# Patient Record
(demographics unavailable — no encounter records)

---

## 2024-10-11 NOTE — ASSESSMENT
[FreeTextEntry1] : FELICIANO FISH is here for an appointment for a follow-up of weight loss medications. Patient doing well on zepbound. Patient had GI symptoms due to E.coli infection.Insomnia during the first day of injection. Appetite comes back on day 4.   Patient Current weight: 143<149.6<--157<--149<--144<--140<--136.8 BMI.26.16<--.27.0<--28.5<- :27.5<-- 26.4<--24.9  Nutrition: basically same, increased protein, not same appetite suppression in comparison of ozempic/Wegovy Breakfast - smoothie, collagen, cottage cheese. Lunch- salad, grilled chicken, hummus Dinner- protein, starch and vegetable Snacking - none Physical activity- walking, weights non consistently  Medication compliance- good  Plan of Care: Encouraged weights and strength training. Regular meals and portions. Increased to 10 mg at this time. To monitor sx for nausea.  Stressed weight bearing exercise.

## 2024-10-11 NOTE — REASON FOR VISIT
[Home] : at home, [unfilled] , at the time of the visit. [Medical Office: ([facility])___] : at the medical office located in  [Patient] : the patient [Follow-Up] : a follow-up visit

## 2024-10-22 NOTE — PHYSICAL EXAM
[FreeTextEntry3] : All fingernails with distal onycholysis, traumatic superficial changes;  R index nail with obvious cystic depression, subungual debris  also:  + dark brown papule R infraorbital

## 2024-10-22 NOTE — HISTORY OF PRESENT ILLNESS
Wound Care Progress Note    Chief Complaint:   Chief Complaint   Patient presents with    Wound     L posterior heel       History of Present Illness:     6/3/2024    Christian Stanford is a 81 year old male with a past medical history of CVA in 2020, HTN, HLD, hypothyroidism, GERD, anemia, end stage PAD, s/p TMA in 2023, s/p left foot multiple angioplasties. He is s/p 6 weeks of IV antibiotics secondary to left lateral foot wound, fifth metatarsal osteomyelitis by MRI on 3/12/2024 (ID: Dr. Browning). He is s/p LLE cellulitis and course of Keflex in May.   He presents to the wound center today for evaluation and treatment of left foot surgical wound and pressure injury wound (left heel; present at least since 12/19/2023 as per EMR review). He is accompanied by his spouse.   His vascular specialist is Dr. Benitez from [facility] in [place]; last encounter was on 5/15/2024- next f/u in 6 months; recommendations- \"Continue to increase activity  Follow-up in 6 months with noninvasive studies and office visit\".  Patient has been under care of different wound care provider here; this is his first encounter with this provider.    6/10/2024    Follow up visit. No new c/o.    Additional EMR review:  Vascular arterial duplex on 2/9/2024 demonstrated the following:   \"  LEFT: Common Femoral Artery: <50% stenosis.  Proximal Superficial   Femoral Artery: <50% stenosis.  Proximal Popliteal: <50% stenosis.  Distal   PTA: occluded.  Distal GENESIS: patent. Distal peroneal artery patent. Absent   flow in left posterior tibial deep venous arterialization stent.     LEFT: Absent flow in left posterior tibial deep venous arterialization   Stent\"  2. DREAD study from 2/9/2024: Left DREAD: Left Doppler Waveforms: PTA: Intermediate resistive monophasic. DPA: Intermediate resistive monophasic. Right DREAD   Right Doppler Waveforms: PTA: Low resistive monophasic. DPA: Intermediate resistive monophasic. Right PPG Waveforms: 1st Toe: Mildly  [de-identified] : Pt. c/o chronic problems with nails;  affecting all fingernails and some toenails;  Took terbinafine x 3 months in 2023 for PAS ++ onycho of right 3rd finger;  however, recently in 7/2024 had surgery to remove cyst R index finger with resulting nail changes; using ciclopirox polish from podiatry, c/o persistent problems abnormal.   3. Vas arterial imaging from 3/11/2024:  Patent right lower extremity vasculature with significant calcifications with mild velocity elevation in the distal superficial femoral artery and popliteal artery.   Patent left lower extremity vasculature with significant calcificiations decreased velocities in the tibial arteries.     Wound site pain: 4/10. Pain type:  dull and throbbing.  Aggravating factors:  walking  Alleviating factors:medication including narcotics  .. He is under care of pain specialist (Dr. Goddard at [facility]); current pain/sleep medications-  Butrans patch 15 mcg, Lyrica 50 TID, Trazodone 100 mg at bedtime. Last apt with Dr. Goddard on 5/6/2024 and f/u in 3 months. He is referred for possible DRG stimulator. Pt reports overall improved pain relief with pain relieving patch (Butrans).    6/17/2024    Follow up. No new c/o.    6/24/2024    Follow up visit. No new c/o BLE skin/wound care.  He is s/p x-ray of left heel/foot on 6/17/2024 and venous reflux BLE on 6/19/2024 (see results interpretations as below).  Pertinent labs from Sed Rate 12, CRP less than 0.3.  Other: patient c/o lower back pain (sacral area) but denies pressure injury; spouse states that he has pressure relief seat cushion at home but is not using it; he is also using diapers due to incontinence     7/1/2024    Follow up. No new c/o.    7/8/2024    Weekly follow up. No new c/o. Spouse changed wound dressing at home on 7/4/2024.     8/5/2024    Follow up visit. Patient denies any new issues.     8/12/2024    The same as above.    8/19/2024    Follow up. No new c/o. Patient is asking if he should  his new DM shoes.     9/3/2024    Follow up. No new c/o.    9/9/2024    Follow up visit. No new c/o.    As per RN wound care note: Offloading to left heel noted to have shited upward with offloading over wound.  Patient also states he is now sleeping in bed again (no longer in recliner with legs down). Discussed he is not  currently using a pillow or heel offloading boots.     9/16/2024    No new c/o. Patient started Cresencio.     9/23/2024    Patient c/o not feeling well on Friday- slight nausea, diarrhea, possible increased swelling to both LE. He is better today. He stopped Cresencio in \"case Cresencio caused symptoms.       Elevation:  The patient is not consistently elevating the lower extremities during the day when sitting. The patient is sleeping in bed at night with legs up; he is ambulating in the home short time/distance    Appetite is good    Falls since last visit: la    Pressure relief:  the patient is using the following pressure relief measures since our last visit:  felt (left foot) Patient has his new shoes/brace- limited use due to heel ulcer    Past Medical History:   Diagnosis Date    Anemia     Cerebral infarction  (CMD)     Chronic pain     Essential (primary) hypertension     Gastroesophageal reflux disease     High cholesterol     Thyroid condition         Past Surgical History:   Procedure Laterality Date    Back surgery      Cardiac catherization      Carotid stent  2020    Femoral bypass  01/28/2023    Knee surgery Left 2000    Vascular surgery         Social History     Socioeconomic History    Marital status: /Civil Union     Spouse name: Not on file    Number of children: Not on file    Years of education: Not on file    Highest education level: Not on file   Occupational History    Not on file   Tobacco Use    Smoking status: Never    Smokeless tobacco: Never   Vaping Use    Vaping status: never used   Substance and Sexual Activity    Alcohol use: Never    Drug use: Never    Sexual activity: Not on file   Other Topics Concern    Not on file   Social History Narrative    Not on file     Social Determinants of Health     Financial Resource Strain: Medium Risk (3/15/2024)    [facility]    Overall Financial Resource Strain (CARDIA)      Difficulty of Paying Living Expenses: Somewhat hard   Food Insecurity: No Food Insecurity (3/15/2024)    [facility]    Hunger Vital Sign     Worried About Running Out of Food in the Last Year: Never true     Ran Out of Food in the Last Year: Never true   Transportation Needs: No Transportation Needs (3/15/2024)    [facility]    PRAPARE - Transportation     Lack of Transportation (Medical): No     Lack of Transportation (Non-Medical): No   Physical Activity: Not on file   Stress: Not on file   Social Connections: Not on file   Interpersonal Safety: Not on file        ALLERGIES:  Dairy digestive     Current Outpatient Medications   Medication Sig Dispense Refill    amLODIPine (NORVASC) 5 MG tablet [None received]      Xarelto 2.5 MG Tab [None received]      pregabalin (LYRICA) 50 MG capsule [None received]      lisinopril (ZESTRIL) 10 MG tablet [None received]      levothyroxine 50 MCG tablet [None received]      gabapentin (NEURONTIN) 300 MG capsule [None received]      esomeprazole (NexIUM) 40 MG capsule [None received]      Baclofen 5 MG tablet [None received]       No current facility-administered medications for this encounter.        Review of Systems  Review of Systems   Constitutional:  Negative for activity change, appetite change, chills, diaphoresis, fatigue, fever and unexpected weight change.   HENT:  Positive for hearing loss.    Respiratory: Negative.     Cardiovascular:  Positive for leg swelling. Negative for palpitations.   Gastrointestinal:  Negative for abdominal pain, blood in stool, constipation, nausea and vomiting.   Genitourinary: Negative.         Incontinent of urine   Musculoskeletal:  Positive for arthralgias, back pain and gait problem.   Skin:  Positive for rash and wound.   Neurological:  Negative for dizziness and headaches.    Psychiatric/Behavioral:  Negative for confusion.        Physical Exam  Vitals:    09/23/24 0820   BP: (!) 162/71   Pulse: 73   Temp: 97.8 °F (36.6 °C)      SpO2 Readings from Last 1 Encounters:   09/23/24 96%        Wt Readings from Last 4 Encounters:   09/23/24 76 kg (167 lb 8.8 oz)   09/16/24 77.7 kg (171 lb 4.8 oz)   09/09/24 78.6 kg (173 lb 4.5 oz)   09/03/24 78.7 kg (173 lb 8 oz)         Physical Exam  Vitals reviewed.   Constitutional:       General: He is not in acute distress.  HENT:      Head: Normocephalic.      Neck: Normal range of motion.   Pulmonary:      Effort: No respiratory distress.   Abdominal:      General: There is no distension.   Musculoskeletal:      Right lower leg: Edema present.      Left lower leg: Edema present.      Comments: Ambulates with a walker, poor posture   Feet:      Right foot:      Toenail Condition: Right toenails are abnormally thick.      Left foot:      Toenail Condition: Left toenails are abnormally thick.      Comments: Chronic BLE hyperpigmentation   Skin:     Findings: Lesion and rash present.   Neurological:      Mental Status: He is alert and oriented to person, place, and time. Mental status is at baseline.      Motor: No weakness.      Gait: Gait abnormal.   Psychiatric:         Mood and Affect: Mood normal.         Behavior: Behavior normal.         Wound Heel Left Posterior (Active)   Date First Assessed/Time First Assessed: 12/19/23 0800   Present on Original Admission: Yes  Location: Heel  Laterality: Left  Modifier: Posterior      Assessments 9/23/2024  8:30 AM   Wound Image      Dressing Assessment Intact;Drainage present   Dressing Activity Changed   Dressing Changed On   09/23/24   Wound Exudate Moderate;Serosanguineous;No odor   Cleansing Agent Commercial cleansing solution   Wound Bed/Tissue Type Red   Periwound Condition Dry;Callus   Wound Edge Unattached to wound bed;Well defined   Wound Status Unchanged   Topical Agent Collagen   Wound Dressing  Foam (Ioplex, 1 thin, 2 thick felt)   Wound Last Measured 09/23/24   Wound Length (cm) 0.3 cm   Wound Width (cm) 1.5 cm   Wound Depth (cm) 0.4 cm   Wound Surface Area (cm^2) 0.45 cm^2   Wound Volume (cm^3) 0.18 cm^3   PhotoTaken? Yes   Wound Volume Change (Initial) -4.77 cm3   Wound Volume % Change (Initial) -96.36 %   Wound Volume Change (30 days) 0.14 cm3   Wound Volume % Change (30 days) 400 %      Current Treatment Regimen:        Laboratory assessments:   No results found for: \"PAB\"    No results available in last 24 hours   Lab Results   Component Value Date    CRP <3.0 06/17/2024     No results found for this or any previous visit.  No results found for this or any previous visit.         Assessment and Plan:   Problem List Items Addressed This Visit          Cardiac and Vasculature    Venous insufficiency of both lower extremities     Edema Assessment and Plan:    - patient is able to only tolerate approximately 5-10 mmHg compression therapy and is not always adherent to use of Tubigrip  - s/p venous reflux on 6/19/2024: demonstrated deep and superficial venous insufficiency (R GSV); pt is to follow up with his doctor in October (apt is pending)  - reviewed options for in- home use of pneumatic compression- patient will discuss it with his vascular surgeon during next visit  - reviewed options for radiofrequency ablation- patient will discuss it with his vascular surgeon during next visit    The patient's bilateral LE edema is not well controlled. We will thus continue current compression as below in therapy plan orders.  There is persistent statis dermatitis (no change)     See therapy plan orders below    The patient was instructed to:  Elevate legs while sitting-above the heart if possible  Sleep in bed at night  Ambulate frequently during the day  Avoid salt shaker, reduce high sodium content foods  Monitor for compromised arterial status such as increased pain, toes turning blue or cool. If this occurs,  remove and loosen wraps and notify wound center.  Keep dressings clean and dry  Take diuretics if and as prescribed   Use Cpap daily when sleeping if prescribed           Relevant Orders    Complete Wound Care Posterior Heel    Complete Wound Care Friction/Shear Distal Foot       Musculoskeletal and Injuries    Arterial leg ulcer  (CMD)     Wound Assessment:     Wound classification: Arterial Ulcer     Wound site: Left Posterior Foot- remains healed; there is adherent dry scab - mechanically removed     Plan:     -Continue with surgical shoe     -Continue with high protein diet     -Offloading felt         See therapy plan orders below for additional local wound orders     The patient was instructed to call with signs or symptoms of infection including redness, warmth, odor, fever, chills, increasing wound site pain.  The patient was instructed about wound care procedure/technique.    Updated with home care nurse about care plan:  Yes            Relevant Orders    Complete Wound Care Posterior Heel    Complete Wound Care Friction/Shear Distal Foot    Pressure injury of left heel, unstageable  (CMD) - Primary     Location: Left heel:    Etiology: pressure injury; wound healing is stalled    X-ray on 6/17/2024: no evidence of osteomyelitis, CRP/ESR- normal  Insurance denied skin substitution (Epifix). Patient is not qualifies for Hyperbaric Oxygen Treatment as per HBO chamber coordinator (discussed last week).    Wound base: granulation, slough. Drainage is small/no odor, serosanguineous. Edges are less rolled.     Periwound skin is without erythema, pain, induration. There is callus; will para down.    No s/s infection. However, wound healing is slow.    Plan:  - will continue with local wound care and offloading as per therapy plan below; will hold on transitioning pt into the specialty DM shoes until wound has healed  - we will increase visits here to 3 x week starting next week ( in order more consistent  evaluation/treatment)  - continue with ongoing measures to prevent pressure to the wound/heel  - continue with Cresencio in order to maximize nutrition once patient's GI symptoms resolved       Procedure: Wound sites: left heel    #1 Procedure: Wound sites: left heel  Debridement: Selective Non-Excisional Debridement of Non-viable Tissue.  Informed consent obtained.  Time out was completed.  Patient, procedure, and site verified.  Anesthesia-  None  Total debrided area was less than 20 sq cm    Instrument-  Curette  Non-viable tissue removed-  Fibrin/Slough  Hemostasis achieved-  Pressure and Cautery  Refer to nursing notes for wound dimensions and assessment.  Patient stable upon completion of procedure.  Percent debrided: 100  Procedure was Performed by:  RAYMON Tyler              WOUND TREATMENT ORDERS:  Complete Wound Care Posterior Heel  Every visit  Diagnosis: Surgical wound  Diagnosis: Other (specify)  Other (specify): Pressure ulcer  Dressing change(s) to be done by: Wound Care Team  Dressing change(s) to be done by: Other (specify)  Other (specify):  RN  Dressing frequency: M/W/F  Wound location: Left Heel  Dressing change(s) to be done using: Clean Technique  Soak wound for (minutes): 5  Soak solution: Vashe (Hydrochlorous Acid) if available from wound care  Clean wound with: Same as ordered soak solution  Clean wound with: Wound cleanser  Protect periwound with: Skin prep  Dressing type: Foam silicone dressing without borders  Specify order of application: Apply piece Saline moistened Collagen into wound depth followed by strip of Ioplex foam loosely packed. Cover with piece of unbordered foam (Ex: Mepilex), 3 layer U-Shape felt (1 thin and 2 thick) to offload the heel, secure with tape  Wound compression: Tubular compression bandage - Low (5-10 mmHg)  Compression for extremity: Bilateral lower legs  Complete Wound Care Friction/Shear Distal Foot  Every visit  Diagnosis: Other (specify)  Other  (specify): Friction  Wound location: Left Distal Foot  Dressing change(s) to be done by: Wound Care Team  Dressing change(s) to be done by: Home Care Nurse  Dressing frequency: M/W/F  Clean wound with: Wound cleanser  Protect periwound with: Skin prep  Dressing type: Foam silicone dressing without borders  Cover dressing: Tape-fabric (e.g., Medipore)  Specify order of application: Left Distal Foot: Cleanse with wound cleanser, pat dry. Apply piece of Xeroform gauze. Cover with foam and 1 layer thin felt donut. Secure with tape.    Comments:  Tubular compression sleeve F, Left lateral foot: Protect healed area with unbordered foam and secure with tape    Other comments: offloading applied by home health RN on Friday is different than recommended last encounter - will contact home health RN and educate    Follow up at the wound center: 3 x week starting next week    HOME HEALTH CARE AGENCY:  [facility]       The time of this note does not reflect exact time patient seen    Above plan was discussed in detail with patient and/or caregiver.The patient and/or guardian/caregiver have been provided with verbal and/or written information pertinent to the reason for today's visit. Patient in understanding and agreement. No barriers to learning identified. All questions were answered to satisfaction.     I spent a total of 30 mins on this encounter involved in this patient's care reviewing necessary laboratory tests and imaging, chart notes, and counseling the patient, family member, and/or caregiver, ordering appropriate medications/tests/procedures, reporting results, care coordination, communicating with other healthcare professionals, and documenting. This includes face to face time, hands on care, and education.  More then 50% of the time was counseling and coordination of care discussing diagnosis, prevention, diet, and treatment options.

## 2024-10-22 NOTE — ASSESSMENT
[FreeTextEntry1] : Nail changes;  fungus appears to be resolved, this is more consistent with some type of repetitive trauma d/c ciclopirox polish, avoid "self drying" nail polish;  Pt. states right index is growing out, recheck in 3 months;  t/c re-do PAS although doubt reinfection Likely trauma and scarring from cyst removal surgery  also:  ? small cyst vs SK under R eye;  f/u for snip removal or I&D;  (OV)  recheck nails 3 mos, t/c re-do PAS   Exam/ documentation/ discussion Time spent:  30 min

## 2024-11-07 NOTE — PLAN
[FreeTextEntry1] : Pap deferred until 2026 per ASCCP guidelines.   Prescription renewal for Premarin .625 MG given.  Will switch from Estrace cream to Vagifem. We discussed that vaginal dryness and painful sex is secondary to vaginal atrophy, which is common in perimenopause and menopause, this is referred to as genitourinary syndrome of menopause.  We discussed menopausal changes in the vagina and bladder secondary to the significant reduction in estrogen. I explained that unlike the other symptoms of menopause that typically resolve over time, genitourinary syndrome of menopause typically worsens if left untreated.   We discussed the use of over-the-counter vaginal lubricants for intercourse, including the use of coconut oil both internally and externally.   We also discussed the benefits of over-the-counter vaginal moisturizers. We discussed the fact that these are recommended to be used regularly 2-3 times a week for maximum benefit.   We also discussed the benefits and roles of local menopausal hormone therapy via vaginal estrogen. She is aware that there is a minimal to insignificant absorption into the bloodstream with vaginal estrogen. This is regarded as very safe by the FDA. We even discussed that ACOG recommends it as a treatment option even in women with a history of breast cancer.   We discussed the different formulations of vaginal estrogen therapy including estrogen cream, vaginal estrogen tablets, and a vaginal estrogen ring. Prescription was sent to the pharmacy for vagifem. She is aware that for the first 2 weeks, she needs to insert the tablet vaginally nightly, and then after the first 2 weeks it is 2 times per week. She is aware that she may not have a significant improvement in symptoms for 3 months. If there is not significant improvement after 3 months, she was instructed to follow up. She is aware that she can also use both vaginal moisturizers and vaginal estrogen for added improvement. She is aware she would need to alternate the insertion of both, and I recommend against inserting both at the same time.   She is aware that if she has any vaginal bleeding after the first 3 months of initiating treatment, she should return for a uterine evaluation. She is also aware that she may still require lubrication for intercourse. Questions answered.  Prescription for mammogram screening and breast US given. Self-breast exam reviewed.  Prescription for DEXA studies were given secondary to a history of osteopenia.   She will follow up annually and as needed.

## 2024-11-07 NOTE — HISTORY OF PRESENT ILLNESS
[N] : Patient reports normal menses [Y] : Positive pregnancy history [Menarche Age: ____] : age at menarche was [unfilled] [No] : Patient does not have concerns regarding sex [Currently Active] : currently active [Men] : men [Mammogramdate] : 12/12/2023 [TextBox_19] : BR2 [BreastSonogramDate] : 12/12/2023 [TextBox_25] : BR2 [PapSmeardate] : 09/01/2023 [TextBox_31] : NEGATIVE- [BoneDensityDate] : 10/28/2022 [TextBox_37] : OSTEOPENIA [ColonoscopyDate] : 03/16/2021 [TextBox_43] : WNL AS PER PATIENT [HPVDate] : 09/01/2023 [TextBox_78] : NEGATIVE- [LMPDate] : 2012 [PGHxTotal] : 2 [[facility]FullTerm] : 2 [[facility] CenterxLiving] : 2 [FreeTextEntry1] : 2012 [FreeTextEntry2] : ENGAGED

## 2024-11-07 NOTE — HISTORY OF PRESENT ILLNESS
[N] : Patient reports normal menses [Y] : Positive pregnancy history [Menarche Age: ____] : age at menarche was [unfilled] [No] : Patient does not have concerns regarding sex [Currently Active] : currently active [Men] : men [Mammogramdate] : 12/12/2023 [TextBox_19] : BR2 [BreastSonogramDate] : 12/12/2023 [TextBox_25] : BR2 [PapSmeardate] : 09/01/2023 [TextBox_31] : NEGATIVE- [BoneDensityDate] : 10/28/2022 [TextBox_37] : OSTEOPENIA [ColonoscopyDate] : 03/16/2021 [TextBox_43] : WNL AS PER PATIENT [HPVDate] : 09/01/2023 [TextBox_78] : NEGATIVE- [LMPDate] : 2012 [PGHxTotal] : 2 [[facility] InstitutexFullTerm] : 2 [[facility] CenterxLiving] : 2 [FreeTextEntry1] : 2012 [FreeTextEntry2] : ENGAGED

## 2024-11-12 NOTE — ASSESSMENT
[FreeTextEntry1] : Pt. states right index is growing out, recheck in 3 months;  t/c re-do PAS although doubt reinfection Likely trauma and scarring from cyst removal surgery   Therapeutic options and their risks and benefits; along with multiple diagnostic possibilities were discussed at length; risks and benefits of further study were discussed;   I&D to large inflamed milium under R eye;  resolved;    recheck nails 3 mos, t/c re-do PAS

## 2024-11-12 NOTE — HISTORY OF PRESENT ILLNESS
[de-identified] : Pt. 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

## 2024-11-12 NOTE — PHYSICAL EXAM
[FreeTextEntry3] :  R index nail with obvious cystic depression, subungual debris- now with complete loss of nail  also:  + dark brown cystic papule R infraorbital

## 2025-01-10 NOTE — ASSESSMENT
[FreeTextEntry1] : FELICIANO FISH is here for an appointment for a follow-up of weight loss medications. Patient doing well on zepbound. Patient has insomnia on the first day of medication.   Patient Current weight: 143<149.6<--157<--149<--144<--140<--136.8 BMI.26.16<--.27.0<--28.5<- :27.5<-- 26.4<--24.9  Nutrition: basically same, increased protein, Breakfast - smoothie, collagen, cottage cheese. Lunch- salad, grilled chicken, hummus Dinner- protein, starch and vegetable Snacking - none Physical activity- walking,   Medication compliance- good  Plan of Care: Encouraged weights and strength training. Regular meals and portions. [id number] mg zepbound at this time. To monitor sx for nausea. Discussed conservative management with sleep disorder Return in 3 month.

## 2025-01-10 NOTE — HISTORY OF PRESENT ILLNESS
[Home] : at home, [unfilled] , at the time of the visit. [Medical Office: ([facility])___] : at the medical office located in  [Verbal consent obtained from patient] : the patient, [unfilled] [FreeTextEntry1] : weight loss followup

## 2025-01-17 NOTE — END OF VISIT
[FreeTextEntry3] : I, Marcelino Freeman, acted solely as a scribe for Dr. Jose Carmona on this date 06/01/2022.

## 2025-01-17 NOTE — HISTORY OF PRESENT ILLNESS
[Pain Location] : pain [Worsening] : worsening [___ mths] : [unfilled] month(s) ago [6] : a current pain level of 6/10 [3] : a minimum pain level of 3/10 [7] : a maximum pain level of 7/10 [Walking] : worsened by walking [Rest] : relieved by rest [de-identified] : This is 55 y/o female who presents for recurrent right hip pain.  She was last seen May 2024 and had US guided hip injection June 2024 which she reports she did very well with. She had a right hip MRI in 2019 showing a labral tear and degeneration.

## 2025-01-17 NOTE — DISCUSSION/SUMMARY
[Medication Risks Reviewed] : Medication risks reviewed [de-identified] : 55 y/o F with moderate right hip osteoarthritis.  Patient has been having worsening right-sided intermittent groin pain.  I ordered repeat US guided injectino which she did very well wtith June 2024. Follow up 3 months

## 2025-01-17 NOTE — PHYSICAL EXAM
[de-identified] : GENERAL APPEARANCE: Well nourished and hydrated, pleasant, alert, and oriented x 3. Appears their stated age.  HEENT: Normocephalic, extraocular eye motion intact. Nasal septum midline. Oral cavity clear. External auditory canal clear.  RESPIRATORY: Breath sounds clear and audible in all lobes. No wheezing, No accessory muscle use. CARDIOVASCULAR: No apparent abnormalities. No lower leg edema. No varicosities. Pedal pulses are palpable. NEUROLOGIC: Sensation is normal, no muscle weakness in the upper or lower extremities. DERMATOLOGIC: No apparent skin lesions, moist, warm, no rash. SPINE: Cervical spine appears normal and moves freely; thoracic spine appears normal and moves freely; lumbosacral spine appears normal and moves freely, normal, nontender. MUSCULOSKELETAL: Hands, wrists, and elbows are normal and move freely, shoulders are normal and move freely.  Musculoskeletal: Gait: normal and not antalgic .  5/5 motor strength in bilateral lower extremities. Sensory: Intact in bilateral lower extremities. DTRs: Biceps, brachioradialis, triceps, patellar, ankle and plantar 2+ and symmetric bilaterally. Pulses: dorsalis pedis, posterior tibial, femoral, popliteal, and radial 2+ and symmetric bilaterally.  Constitutional: Alert and in no acute distress, but well-appearing, not in acute distress and not obese.  [de-identified] : Right hip exam good range of motion mild pain with internal rotation.  No pain with straight leg raise.  No tenderness over the trochanteric bursa

## 2025-01-17 NOTE — REASON FOR VISIT
[Follow-Up Visit] : a follow-up visit for [Hip Pain] : hip pain [Other: ____] : [unfilled] [FreeTextEntry2] : Right hip pain

## 2025-01-21 NOTE — PHYSICAL EXAM
[FreeTextEntry3] : + superficial traumatic changes of fingernails;  mycotic toenails:  b/l great toes, left 3,4

## 2025-01-21 NOTE — ASSESSMENT
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

## 2025-01-21 NOTE — HISTORY OF PRESENT ILLNESS
[de-identified] : Pt. 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

## 2025-03-01 NOTE — END OF VISIT
[FreeTextEntry3] : I, Jona Cleary, solely acted as scribe for Dr. Janice Castellon on 02/26/2025.   All medical entries made by the Scribe were at my, Dr. Cutler, direction and personally dictated by me on 02/26/2025 . I have reviewed the chart and agree that the record accurately reflects my personal performance of the history, physical exam, assessment and plan. I have also personally directed, reviewed, and agreed with the chart. [Time Spent: ___ minutes] : I have spent [unfilled] minutes of time on the encounter which excludes teaching and separately reported services.

## 2025-03-01 NOTE — DISCUSSION/SUMMARY
[FreeTextEntry1] : 1. We discussed in detail her perimenopausal/menopausal symptoms and how they affect her daily quality of life. We reviewed the risks of untreated vasomotor symptoms and diminished sleep as a cardiac risk factor. Her medical history was reviewed in detail. There are no contraindications to menopausal hormone therapy. We discussed systemic menopause hormone therapy, as well as nonhormonal treatment options for her menopausal symptoms.   She is aware that systemic menopausal hormone therapy is FDA approved and is recommended for women within 10 years of menopause and less than 60 years old with vasomotor symptoms and other menopausal symptoms.   She does not need progesterone as she has had a hysterectomy.  She wants to discontinue oral premarin. We discussed the different formulations of hormones and routes of estrogen delivery via transdermal routes (patches, gels, sprays), transvaginal formulations via a Femring. We discussed the benefits and risks of each different routes individually.    She declines the femring for vasomotor symptoms and GSM. She desires estradiol gel and is aware it is a daily application. Prescription sent for estradiol gel 0.05mg.   2. Per her request, FSH and estradiol levels joe. I explained it is expected that she will have an elevated FSH and low estradiol level.   3. For GSM, she wishes to continue yuvafem.  4. We discussed the diagnosis of hypoactive sexual desire disorder. In her case, she does not have vaginal symptoms or pain as a cause for her decreased libido. We discussed the 3 treatment options for decreased libido in women, flibanserin (Addyi), bremelanotide (Vylessi), and testosterone therapy. I explained that both Flibanserin & Bremelanotide are FDA approved for use in premenopausal women and not postmenopausal women. If we used either of these in a postmenopausal woman, it would be off label. I explained the risks, benefits, side effects and cost of the above. She has decided to pursue a trial of testosterone therapy. We discussed the need for baseline testosterone levels, in addition to CBC, cholesterol panel, and LFTs. I explained that testosterone therapy is not FDA approved for women, and it is used off label. Since there are no FDA approved testosterone products for women, my preference is to use a compounded pharmacy. We discussed that it helps 50% of women. We discussed significant potential side effects that could be irreversible including acne, hair loss, voice changes. She will return in 1 month for repeat testosterone levels.  5. For hair loss, we discussed trying hair tonic from a compounding pharmacy. Rx sent to [facility].

## 2025-03-01 NOTE — REVIEW OF SYSTEMS
[Genital Rash/Irritation] : genital rash/irritation [Decreased Libido] : decreased libido [Hot Flashes] : hot flashes

## 2025-03-01 NOTE — HISTORY OF PRESENT ILLNESS
[postmenopausal] : postmenopausal [Y] : Positive pregnancy history [Menarche Age: ____] : age at menarche was [unfilled] [No] : Patient does not have concerns regarding sex [Currently Active] : currently active [Men] : men [TextBox_19] : BR3 [Mammogramdate] : 12/13/24 [BreastSonogramDate] : 12/13/24 [TextBox_25] : BR3 [PapSmeardate] : 09/01/23 [TextBox_31] : NEG [BoneDensityDate] : 11/08/24 [TextBox_37] : OSTEOPENIA [HPVDate] : 09/01/23 [TextBox_78] : NEG [LMPDate] : 2012 [de-identified] : (HYSTERECTOMY) [PGHxTotal] : 2 [[facility] : 2 [[facility]] : 2 [FreeTextEntry1] : 2012

## 2025-03-01 NOTE — HISTORY OF PRESENT ILLNESS
[postmenopausal] : postmenopausal [Y] : Positive pregnancy history [Menarche Age: ____] : age at menarche was [unfilled] [No] : Patient does not have concerns regarding sex [Currently Active] : currently active [Men] : men [TextBox_19] : BR3 [Mammogramdate] : 12/13/24 [BreastSonogramDate] : 12/13/24 [TextBox_25] : BR3 [TextBox_31] : NEG [PapSmeardate] : 09/01/23 [BoneDensityDate] : 11/08/24 [TextBox_37] : OSTEOPENIA [HPVDate] : 09/01/23 [TextBox_78] : NEG [LMPDate] : 2012 [de-identified] : (HYSTERECTOMY) [PGHxTotal] : 2 [[facility]FullTerm] : 2 [[facility]Living] : 2 [FreeTextEntry1] : 2012

## 2025-03-21 NOTE — REASON FOR VISIT
[Home] : at home, [unfilled] , at the time of the visit. [Medical Office: ([facility])___] : at the medical office located in  [Telehealth (audio & video)] : This visit was provided via telehealth using real-time 2-way audio visual technology. [Verbal consent obtained from patient] : the patient, [unfilled] [Follow-Up] : a follow-up visit

## 2025-03-21 NOTE — ASSESSMENT
[FreeTextEntry1] : FELICIANO FISH is here for an appointment for a follow-up of weight loss medications. Patient feeling fatigued on Zepbound. Increased hair loss. Patient would like to switch back to wegovy.  Patient Current weight: 143<149.6<--157<--149<--144<--140<--136.8 BMI.26.16<--.27.0<--28.5<- :27.5<-- 26.4<--24.9  Nutrition: basically same, increased protein, Breakfast - smoothie, collagen, cottage cheese. Lunch- salad, grilled chicken, hummus Dinner- protein, starch and vegetable Snacking -celery, carrot, hummus Physical activity- walking,   Medication compliance- good  Plan of Care: Encouraged weights and strength training. Regular meals and portions- stressed protein intake.  Will switch back to wegovy 0.5mg for maintenance at this time. Patient aware of authorization needed.  Recommended patient to f/u with PCP to r/o any other causes for hair loss.

## 2025-05-20 NOTE — HEALTH RISK ASSESSMENT
[Good] : ~his/her~  mood as  good [No] : In the past 12 months have you used drugs other than those required for medical reasons? No [No falls in past year] : Patient reported no falls in the past year [Little interest or pleasure doing things] : 1) Little interest or pleasure doing things [Feeling down, depressed, or hopeless] : 2) Feeling down, depressed, or hopeless [0] : 2) Feeling down, depressed, or hopeless: Not at all (0) [PHQ-2 Negative - No further assessment needed] : PHQ-2 Negative - No further assessment needed [Patient reported PAP Smear was normal] : Patient reported PAP Smear was normal [With Significant Other] : lives with significant other [Employed] : employed [College] : College [Significant Other] : lives with significant other [# Of Children ___] : has [unfilled] children [Sexually Active] : sexually active [Feels Safe at Home] : Feels safe at home [Fully functional (bathing, dressing, toileting, transferring, walking, feeding)] : Fully functional (bathing, dressing, toileting, transferring, walking, feeding) [Fully functional (using the telephone, shopping, preparing meals, housekeeping, doing laundry, using] : Fully functional and needs no help or supervision to perform IADLs (using the telephone, shopping, preparing meals, housekeeping, doing laundry, using transportation, managing medications and managing finances) [Never] : Never [[id number]] : 0 [Reports changes in hearing] : Reports no changes in hearing [Reports changes in vision] : Reports no changes in vision [Reports changes in dental health] : Reports no changes in dental health [MammogramDate] : 12/24 [MammogramComments] : repeat US 6  month  [PapSmearDate] : 09/23 [ColonoscopyDate] : 03/21 [FreeTextEntry2] : NURSE  [de-identified] : male

## 2025-05-20 NOTE — PHYSICAL EXAM
[No Acute Distress] : no acute distress [Well Nourished] : well nourished [Well Developed] : well developed [Well-Appearing] : well-appearing [Normal Sclera/Conjunctiva] : normal sclera/conjunctiva [PERRL] : pupils equal round and reactive to light [EOMI] : extraocular movements intact [Normal Outer Ear/Nose] : the outer ears and nose were normal in appearance [Normal Oropharynx] : the oropharynx was normal [No JVD] : no jugular venous distention [No Lymphadenopathy] : no lymphadenopathy [Supple] : supple [Thyroid Normal, No Nodules] : the thyroid was normal and there were no nodules present [No Respiratory Distress] : no respiratory distress  [No Accessory Muscle Use] : no accessory muscle use [Clear to Auscultation] : lungs were clear to auscultation bilaterally [Normal Rate] : normal rate  [Regular Rhythm] : with a regular rhythm [Normal S1, S2] : normal S1 and S2 [No Murmur] : no murmur heard [No Carotid Bruits] : no carotid bruits [No Abdominal Bruit] : a ~M bruit was not heard ~T in the abdomen [No Varicosities] : no varicosities [Pedal Pulses Present] : the pedal pulses are present [No Edema] : there was no peripheral edema [No Palpable Aorta] : no palpable aorta [No Extremity Clubbing/Cyanosis] : no extremity clubbing/cyanosis [Soft] : abdomen soft [Non Tender] : non-tender [Non-distended] : non-distended [No Masses] : no abdominal mass palpated [No HSM] : no HSM [Normal Bowel Sounds] : normal bowel sounds [Normal Posterior Cervical Nodes] : no posterior cervical lymphadenopathy [Normal Anterior Cervical Nodes] : no anterior cervical lymphadenopathy [No CVA Tenderness] : no CVA  tenderness [No Spinal Tenderness] : no spinal tenderness [No Joint Swelling] : no joint swelling [Grossly Normal Strength/Tone] : grossly normal strength/tone [No Rash] : no rash [Coordination Grossly Intact] : coordination grossly intact [No Focal Deficits] : no focal deficits [Normal Gait] : normal gait [Deep Tendon Reflexes (DTR)] : deep tendon reflexes were 2+ and symmetric [Normal Affect] : the affect was normal [Normal Insight/Judgement] : insight and judgment were intact [Normal Voice/Communication] : normal voice/communication

## 2025-05-20 NOTE — HISTORY OF PRESENT ILLNESS
[FreeTextEntry1] : Establishing Care/ CPE  [de-identified] : admits to joint pains in the past elevated esr and crp would like recheck  no chest pain, no sob, no cough, no fever, no dizziness, no abdominal pain, no n/v/d/c/melena/brbpr/hematuria/dysuria

## 2025-05-20 NOTE — ASSESSMENT
[FreeTextEntry1] : followup labs  reminded to get US breast  followup labs for joint pain -- if persisting see rheumatology  see gyn for annual

## 2025-06-02 NOTE — REASON FOR VISIT
[Home] : at home, [unfilled] , at the time of the visit. [Medical Office: ([facility])___] : at the medical office located in  [Telehealth (audio & video)] : This visit was provided via telehealth using real-time 2-way audio visual technology. [Verbal consent obtained from patient] : the patient, [unfilled] [Follow-Up] : a follow-up visit

## 2025-06-02 NOTE — ASSESSMENT
[FreeTextEntry1] : FELICIANO FISH is here for an appointment for a follow-up of weight loss medications. Doing well on 1mg Wegovy, increased appetitie  Patient Current weight: 141<--143<149.6<--157<--149<--144<--140<--136.8 BMI.25.79<--26.16<--.27.0<--28.5<- :27.5<-- 26.4<--24.9  Nutrition: basically same, increased protein, Breakfast - smoothie, collagen, cottage cheese,arturo pudding Lunch- salad, grilled chicken, hummus Dinner- protein, starch and vegetable ( more carb heavy) Snacking -celery, carrot, hummus, increased snacking  Physical activity- walking,   Medication compliance- good  Plan of Care: Encouraged weights and strength training. Regular meals and portions- stressed protein intake.  Continue wegovy 1mg and then increase to 1.7mg next month if suppression not achieved. .